# Patient Record
Sex: FEMALE | URBAN - METROPOLITAN AREA
[De-identification: names, ages, dates, MRNs, and addresses within clinical notes are randomized per-mention and may not be internally consistent; named-entity substitution may affect disease eponyms.]

---

## 2017-03-14 ENCOUNTER — APPOINTMENT (RX ONLY)
Dept: URBAN - METROPOLITAN AREA CLINIC 148 | Facility: CLINIC | Age: 22
Setting detail: DERMATOLOGY
End: 2017-03-14

## 2017-03-14 DIAGNOSIS — L73.2 HIDRADENITIS SUPPURATIVA: ICD-10-CM

## 2017-03-14 DIAGNOSIS — D22 MELANOCYTIC NEVI: ICD-10-CM

## 2017-03-14 PROBLEM — D22.5 MELANOCYTIC NEVI OF TRUNK: Status: ACTIVE | Noted: 2017-03-14

## 2017-03-14 PROCEDURE — 99202 OFFICE O/P NEW SF 15 MIN: CPT | Mod: 25

## 2017-03-14 PROCEDURE — ? COUNSELING

## 2017-03-14 PROCEDURE — ? INTRALESIONAL KENALOG

## 2017-03-14 PROCEDURE — 11900 INJECT SKIN LESIONS </W 7: CPT

## 2017-03-14 ASSESSMENT — LOCATION SIMPLE DESCRIPTION DERM
LOCATION SIMPLE: RIGHT AXILLARY VAULT
LOCATION SIMPLE: RIGHT UPPER BACK
LOCATION SIMPLE: LEFT POSTERIOR AXILLA
LOCATION SIMPLE: LEFT AXILLARY VAULT

## 2017-03-14 ASSESSMENT — LOCATION DETAILED DESCRIPTION DERM
LOCATION DETAILED: LEFT POSTERIOR AXILLA
LOCATION DETAILED: RIGHT AXILLARY VAULT
LOCATION DETAILED: RIGHT MEDIAL UPPER BACK
LOCATION DETAILED: LEFT AXILLARY VAULT

## 2017-03-14 ASSESSMENT — LOCATION ZONE DERM
LOCATION ZONE: AXILLAE
LOCATION ZONE: TRUNK

## 2017-03-14 NOTE — PROCEDURE: INTRALESIONAL KENALOG
Include Z78.9 (Other Specified Conditions Influencing Health Status) As An Associated Diagnosis?: No
Kenalog Preparation: Kenalog
Consent: The risks of atrophy were reviewed with the patient.
Detail Level: Detailed
Concentration Of Kenalog Solution Injected (Mg/Ml): 40.0
X Size Of Lesion In Cm (Optional): 0
Medical Necessity Clause: This procedure was medically necessary because the lesions that were treated were:
Administered By (Optional): Abelardo Avitia
Ndc# For Kenalog Only: [id number]
Lot # For Kenalog (Optional): [id number]
Expiration Date For Kenalog (Optional): Apr 2018
Total Volume (Ccs): 0.4

## 2017-04-06 ENCOUNTER — APPOINTMENT (RX ONLY)
Dept: URBAN - METROPOLITAN AREA CLINIC 148 | Facility: CLINIC | Age: 22
Setting detail: DERMATOLOGY
End: 2017-04-06

## 2017-04-06 DIAGNOSIS — L73.2 HIDRADENITIS SUPPURATIVA: ICD-10-CM

## 2017-04-06 PROCEDURE — ? COUNSELING

## 2017-04-06 PROCEDURE — 11900 INJECT SKIN LESIONS </W 7: CPT

## 2017-04-06 PROCEDURE — ? INTRALESIONAL KENALOG

## 2017-04-06 ASSESSMENT — LOCATION SIMPLE DESCRIPTION DERM
LOCATION SIMPLE: GENITALIA
LOCATION SIMPLE: LEFT AXILLARY VAULT

## 2017-04-06 ASSESSMENT — LOCATION ZONE DERM
LOCATION ZONE: TRUNK
LOCATION ZONE: AXILLAE

## 2017-04-06 ASSESSMENT — LOCATION DETAILED DESCRIPTION DERM
LOCATION DETAILED: LEFT AXILLARY VAULT
LOCATION DETAILED: GENITALIA

## 2017-04-06 NOTE — PROCEDURE: INTRALESIONAL KENALOG
Consent: The risks of atrophy were reviewed with the patient.
Medical Necessity Clause: This procedure was medically necessary because the lesions that were treated were:
Total Volume (Ccs): 0.5
Expiration Date For Kenalog (Optional): Apr 2018
Lot # For Kenalog (Optional): [id number]
Kenalog Preparation: Kenalog
Administered By (Optional): Abelardo Avitia
Concentration Of Kenalog Solution Injected (Mg/Ml): 40.0
Include Z78.9 (Other Specified Conditions Influencing Health Status) As An Associated Diagnosis?: No
Ndc# For Kenalog Only: [id number]
Detail Level: Detailed
X Size Of Lesion In Cm (Optional): 0

## 2017-10-31 ENCOUNTER — APPOINTMENT (RX ONLY)
Dept: URBAN - METROPOLITAN AREA CLINIC 148 | Facility: CLINIC | Age: 22
Setting detail: DERMATOLOGY
End: 2017-10-31

## 2017-10-31 DIAGNOSIS — L08.9 LOCAL INFECTION OF THE SKIN AND SUBCUTANEOUS TISSUE, UNSPECIFIED: ICD-10-CM

## 2017-10-31 DIAGNOSIS — L73.2 HIDRADENITIS SUPPURATIVA: ICD-10-CM

## 2017-10-31 PROCEDURE — ? ORDER TESTS

## 2017-10-31 PROCEDURE — ? PRESCRIPTION

## 2017-10-31 PROCEDURE — ? COUNSELING

## 2017-10-31 PROCEDURE — 99213 OFFICE O/P EST LOW 20 MIN: CPT

## 2017-10-31 RX ORDER — MUPIROCIN 20 MG/G
OINTMENT TOPICAL BID
Qty: 1 | Refills: 1 | Status: ERX | COMMUNITY
Start: 2017-10-31

## 2017-10-31 RX ORDER — CLINDAMYCIN PHOSPHATE 10 MG/G
GEL TOPICAL QHS
Qty: 1 | Refills: 1 | Status: ERX | COMMUNITY
Start: 2017-10-31

## 2017-10-31 RX ORDER — DOXYCYCLINE 100 MG/1
CAPSULE ORAL BID
Qty: 28 | Refills: 0 | Status: ERX | COMMUNITY
Start: 2017-10-31

## 2017-10-31 RX ORDER — BENZOYL PEROXIDE 5 G/100G
GEL TOPICAL QHS
Qty: 1 | Refills: 1 | Status: ERX | COMMUNITY
Start: 2017-10-31

## 2017-10-31 RX ADMIN — MUPIROCIN: 20 OINTMENT TOPICAL at 00:00

## 2017-10-31 RX ADMIN — BENZOYL PEROXIDE: 5 GEL TOPICAL at 00:00

## 2017-10-31 RX ADMIN — DOXYCYCLINE: 100 CAPSULE ORAL at 00:00

## 2017-10-31 RX ADMIN — CLINDAMYCIN PHOSPHATE: 10 GEL TOPICAL at 00:00

## 2017-10-31 ASSESSMENT — LOCATION DETAILED DESCRIPTION DERM: LOCATION DETAILED: LEFT SUPRAPUBIC SKIN

## 2017-10-31 ASSESSMENT — LOCATION ZONE DERM: LOCATION ZONE: TRUNK

## 2017-10-31 ASSESSMENT — LOCATION SIMPLE DESCRIPTION DERM: LOCATION SIMPLE: GROIN

## 2017-11-10 ENCOUNTER — APPOINTMENT (RX ONLY)
Dept: URBAN - METROPOLITAN AREA CLINIC 148 | Facility: CLINIC | Age: 22
Setting detail: DERMATOLOGY
End: 2017-11-10

## 2017-11-10 DIAGNOSIS — L73.2 HIDRADENITIS SUPPURATIVA: ICD-10-CM | Status: STABLE

## 2017-11-10 DIAGNOSIS — L08.9 LOCAL INFECTION OF THE SKIN AND SUBCUTANEOUS TISSUE, UNSPECIFIED: ICD-10-CM | Status: IMPROVED

## 2017-11-10 PROCEDURE — ? PRESCRIPTION

## 2017-11-10 PROCEDURE — 99213 OFFICE O/P EST LOW 20 MIN: CPT

## 2017-11-10 PROCEDURE — ? COUNSELING

## 2017-11-10 RX ORDER — SULFAMETHOXAZOLE AND TRIMETHOPRIM 800; 160 MG/1; MG/1
TABLET ORAL BID
Qty: 14 | Refills: 0 | Status: ERX | COMMUNITY
Start: 2017-11-10

## 2017-11-10 RX ADMIN — SULFAMETHOXAZOLE AND TRIMETHOPRIM: 800; 160 TABLET ORAL at 00:00

## 2017-11-10 ASSESSMENT — LOCATION SIMPLE DESCRIPTION DERM: LOCATION SIMPLE: GROIN

## 2017-11-10 ASSESSMENT — LOCATION ZONE DERM: LOCATION ZONE: TRUNK

## 2017-11-10 ASSESSMENT — LOCATION DETAILED DESCRIPTION DERM: LOCATION DETAILED: LEFT SUPRAPUBIC SKIN

## 2018-04-18 ENCOUNTER — APPOINTMENT (RX ONLY)
Dept: URBAN - METROPOLITAN AREA CLINIC 148 | Facility: CLINIC | Age: 23
Setting detail: DERMATOLOGY
End: 2018-04-18

## 2018-04-18 DIAGNOSIS — L73.2 HIDRADENITIS SUPPURATIVA: ICD-10-CM

## 2018-04-18 PROCEDURE — 99214 OFFICE O/P EST MOD 30 MIN: CPT

## 2018-04-18 PROCEDURE — ? TREATMENT REGIMEN

## 2018-04-18 PROCEDURE — ? PRESCRIPTION

## 2018-04-18 PROCEDURE — ? COUNSELING

## 2018-04-18 RX ORDER — DOXYCYCLINE 100 MG/1
CAPSULE ORAL BID
Qty: 60 | Refills: 1 | Status: ERX

## 2018-04-18 RX ORDER — BENZOYL PEROXIDE 5 G/100G
GEL TOPICAL QHS
Qty: 1 | Refills: 1 | Status: CANCELLED
Stop reason: CLARIF

## 2018-04-18 RX ORDER — BENZOYL PEROXIDE 100 MG/ML
LIQUID TOPICAL BID
Qty: 1 | Refills: 1 | Status: ERX | COMMUNITY
Start: 2018-04-18

## 2018-04-18 RX ORDER — CLINDAMYCIN PHOSPHATE 10 MG/G
GEL TOPICAL BID
Qty: 1 | Refills: 1 | Status: ERX

## 2018-04-18 RX ADMIN — BENZOYL PEROXIDE: 100 LIQUID TOPICAL at 00:00

## 2018-04-18 ASSESSMENT — LOCATION DETAILED DESCRIPTION DERM
LOCATION DETAILED: RIGHT AXILLARY VAULT
LOCATION DETAILED: LEFT SUPRAPUBIC SKIN
LOCATION DETAILED: LEFT AXILLARY VAULT

## 2018-04-18 ASSESSMENT — LOCATION SIMPLE DESCRIPTION DERM
LOCATION SIMPLE: RIGHT AXILLARY VAULT
LOCATION SIMPLE: GROIN
LOCATION SIMPLE: LEFT AXILLARY VAULT

## 2018-04-18 ASSESSMENT — LOCATION ZONE DERM
LOCATION ZONE: AXILLAE
LOCATION ZONE: TRUNK

## 2018-04-18 NOTE — PROCEDURE: TREATMENT REGIMEN
Initiate Treatment: Doxycycline 1 tab twice daily, benzoyl peroxide cleanser
Detail Level: Zone
Modify Regimen: Can use clindamycin without benzoyl peroxide
Plan: Discussed possible treatment with humira vs. other option. Patient to take doxycycline 100mg 1 tab twice a day for two weeks and if lesions are controlled can decrease to 1 tablet of doxycycline 100 mg for two weeks. If lesions continue to break out continue with doxycycline 100mg and follow up in two months. Apply Clindamycin topically on the cystic lesion with out BP gel. Continue BP cleanser BID.
Continue Regimen: Clindamycin benzoyl peroxide

## 2018-07-06 ENCOUNTER — APPOINTMENT (RX ONLY)
Dept: URBAN - METROPOLITAN AREA CLINIC 148 | Facility: CLINIC | Age: 23
Setting detail: DERMATOLOGY
End: 2018-07-06

## 2018-07-06 DIAGNOSIS — L73.2 HIDRADENITIS SUPPURATIVA: ICD-10-CM | Status: WELL CONTROLLED

## 2018-07-06 PROCEDURE — ? PRESCRIPTION

## 2018-07-06 PROCEDURE — 99214 OFFICE O/P EST MOD 30 MIN: CPT

## 2018-07-06 PROCEDURE — ? COUNSELING

## 2018-07-06 RX ORDER — CLINDAMYCIN PHOSPHATE 10 MG/G
GEL TOPICAL BID
Qty: 1 | Refills: 1 | Status: ERX

## 2018-07-06 RX ORDER — BENZOYL PEROXIDE 100 MG/ML
LIQUID TOPICAL BID
Qty: 1 | Refills: 1 | Status: ERX

## 2018-07-06 ASSESSMENT — LOCATION DETAILED DESCRIPTION DERM
LOCATION DETAILED: LEFT AXILLARY VAULT
LOCATION DETAILED: RIGHT AXILLARY VAULT
LOCATION DETAILED: LEFT SUPRAPUBIC SKIN

## 2018-07-06 ASSESSMENT — LOCATION ZONE DERM
LOCATION ZONE: AXILLAE
LOCATION ZONE: TRUNK

## 2018-07-06 ASSESSMENT — LOCATION SIMPLE DESCRIPTION DERM
LOCATION SIMPLE: LEFT AXILLARY VAULT
LOCATION SIMPLE: GROIN
LOCATION SIMPLE: RIGHT AXILLARY VAULT

## 2018-10-19 ENCOUNTER — RX ONLY (OUTPATIENT)
Age: 23
Setting detail: RX ONLY
End: 2018-10-19

## 2018-10-19 RX ORDER — DOXYCYCLINE 100 MG/1
CAPSULE ORAL BID
Qty: 60 | Refills: 0 | Status: ERX

## 2018-11-02 ENCOUNTER — APPOINTMENT (RX ONLY)
Dept: URBAN - METROPOLITAN AREA CLINIC 148 | Facility: CLINIC | Age: 23
Setting detail: DERMATOLOGY
End: 2018-11-02

## 2018-11-02 DIAGNOSIS — L72.8 OTHER FOLLICULAR CYSTS OF THE SKIN AND SUBCUTANEOUS TISSUE: ICD-10-CM

## 2018-11-02 DIAGNOSIS — L73.2 HIDRADENITIS SUPPURATIVA: ICD-10-CM

## 2018-11-02 PROCEDURE — ? INTRALESIONAL KENALOG

## 2018-11-02 PROCEDURE — ? COUNSELING

## 2018-11-02 PROCEDURE — ? PRESCRIPTION

## 2018-11-02 PROCEDURE — 99213 OFFICE O/P EST LOW 20 MIN: CPT | Mod: 25

## 2018-11-02 PROCEDURE — ? ORDER TESTS

## 2018-11-02 PROCEDURE — 11900 INJECT SKIN LESIONS </W 7: CPT

## 2018-11-02 RX ORDER — DOXYCYCLINE 100 MG/1
CAPSULE ORAL
Qty: 60 | Refills: 1 | Status: ERX

## 2018-11-02 ASSESSMENT — LOCATION ZONE DERM
LOCATION ZONE: TRUNK
LOCATION ZONE: AXILLAE

## 2018-11-02 ASSESSMENT — LOCATION SIMPLE DESCRIPTION DERM
LOCATION SIMPLE: RIGHT AXILLARY VAULT
LOCATION SIMPLE: GROIN

## 2018-11-02 ASSESSMENT — LOCATION DETAILED DESCRIPTION DERM
LOCATION DETAILED: LEFT SUPRAPUBIC SKIN
LOCATION DETAILED: RIGHT AXILLARY VAULT
LOCATION DETAILED: RIGHT SUPRAPUBIC SKIN

## 2018-11-02 NOTE — PROCEDURE: INTRALESIONAL KENALOG
Kenalog Preparation: Kenalog
Administered By (Optional): Abelardo Avitia PA-C
Include Z78.9 (Other Specified Conditions Influencing Health Status) As An Associated Diagnosis?: No
Medical Necessity Clause: This procedure was medically necessary because the lesions that were treated were:
Concentration Of Kenalog Solution Injected (Mg/Ml): 40.0
X Size Of Lesion In Cm (Optional): 0
Detail Level: Detailed
Total Volume (Ccs): 0.2
Consent: The risks of atrophy were reviewed with the patient.

## 2018-11-21 ENCOUNTER — APPOINTMENT (RX ONLY)
Dept: URBAN - METROPOLITAN AREA CLINIC 148 | Facility: CLINIC | Age: 23
Setting detail: DERMATOLOGY
End: 2018-11-21

## 2018-11-21 DIAGNOSIS — L73.2 HIDRADENITIS SUPPURATIVA: ICD-10-CM

## 2018-11-21 DIAGNOSIS — L90.5 SCAR CONDITIONS AND FIBROSIS OF SKIN: ICD-10-CM

## 2018-11-21 PROCEDURE — 11900 INJECT SKIN LESIONS </W 7: CPT

## 2018-11-21 PROCEDURE — ? INTRALESIONAL KENALOG

## 2018-11-21 PROCEDURE — ? OTHER

## 2018-11-21 PROCEDURE — ? PRESCRIPTION

## 2018-11-21 PROCEDURE — 99213 OFFICE O/P EST LOW 20 MIN: CPT | Mod: 25

## 2018-11-21 PROCEDURE — ? COUNSELING

## 2018-11-21 RX ORDER — DOXYCYCLINE 100 MG/1
CAPSULE ORAL
Qty: 60 | Refills: 1 | Status: ERX | COMMUNITY
Start: 2018-11-21

## 2018-11-21 RX ADMIN — DOXYCYCLINE: 100 CAPSULE ORAL at 00:00

## 2018-11-21 ASSESSMENT — LOCATION SIMPLE DESCRIPTION DERM
LOCATION SIMPLE: RIGHT AXILLARY VAULT
LOCATION SIMPLE: LEFT AXILLARY VAULT
LOCATION SIMPLE: GROIN

## 2018-11-21 ASSESSMENT — LOCATION ZONE DERM
LOCATION ZONE: TRUNK
LOCATION ZONE: AXILLAE

## 2018-11-21 ASSESSMENT — LOCATION DETAILED DESCRIPTION DERM
LOCATION DETAILED: LEFT AXILLARY VAULT
LOCATION DETAILED: LEFT SUPRAPUBIC SKIN
LOCATION DETAILED: RIGHT AXILLARY VAULT

## 2018-11-21 NOTE — PROCEDURE: INTRALESIONAL KENALOG
X Size Of Lesion In Cm (Optional): 0
Concentration Of Kenalog Solution Injected (Mg/Ml): 10.0
Administered By (Optional): Abelardo Avitia PA-C
Consent: The risks of atrophy were reviewed with the patient.
Detail Level: Detailed
Medical Necessity Clause: This procedure was medically necessary because the lesions that were treated were:
Include Z78.9 (Other Specified Conditions Influencing Health Status) As An Associated Diagnosis?: No
Kenalog Preparation: Kenalog
Total Volume (Ccs): .2

## 2018-11-21 NOTE — PROCEDURE: OTHER
Other (Free Text): She will only use topical clindamycin daily. Cleanse BID. Doxy for severe breakouts
Detail Level: Simple
Note Text (......Xxx Chief Complaint.): This diagnosis correlates with the

## 2020-02-11 ENCOUNTER — APPOINTMENT (RX ONLY)
Dept: URBAN - METROPOLITAN AREA CLINIC 148 | Facility: CLINIC | Age: 25
Setting detail: DERMATOLOGY
End: 2020-02-11

## 2020-02-11 DIAGNOSIS — L90.5 SCAR CONDITIONS AND FIBROSIS OF SKIN: ICD-10-CM

## 2020-02-11 DIAGNOSIS — L73.2 HIDRADENITIS SUPPURATIVA: ICD-10-CM | Status: WELL CONTROLLED

## 2020-02-11 PROCEDURE — ? COUNSELING

## 2020-02-11 PROCEDURE — 99213 OFFICE O/P EST LOW 20 MIN: CPT

## 2020-02-11 PROCEDURE — ? PRESCRIPTION

## 2020-02-11 PROCEDURE — ? OTHER

## 2020-02-11 RX ORDER — BENZOYL PEROXIDE 100 MG/ML
LIQUID TOPICAL BID
Qty: 1 | Refills: 11 | Status: ERX | COMMUNITY
Start: 2020-02-11

## 2020-02-11 RX ORDER — CLINDAMYCIN PHOSPHATE 10 MG/G
GEL TOPICAL
Qty: 1 | Refills: 11 | Status: ERX

## 2020-02-11 RX ADMIN — BENZOYL PEROXIDE: 100 LIQUID TOPICAL at 00:00

## 2020-02-11 ASSESSMENT — LOCATION ZONE DERM
LOCATION ZONE: TRUNK
LOCATION ZONE: AXILLAE

## 2020-02-11 ASSESSMENT — LOCATION SIMPLE DESCRIPTION DERM
LOCATION SIMPLE: GROIN
LOCATION SIMPLE: RIGHT AXILLARY VAULT

## 2020-02-11 ASSESSMENT — LOCATION DETAILED DESCRIPTION DERM
LOCATION DETAILED: RIGHT AXILLARY VAULT
LOCATION DETAILED: LEFT SUPRAPUBIC SKIN

## 2020-02-11 NOTE — PROCEDURE: OTHER
Note Text (......Xxx Chief Complaint.): This diagnosis correlates with the
Other (Free Text): Can do injections when areas flare up
Detail Level: Zone

## 2022-01-06 ENCOUNTER — APPOINTMENT (RX ONLY)
Dept: URBAN - METROPOLITAN AREA CLINIC 148 | Facility: CLINIC | Age: 27
Setting detail: DERMATOLOGY
End: 2022-01-06

## 2022-01-06 DIAGNOSIS — L73.2 HIDRADENITIS SUPPURATIVA: ICD-10-CM

## 2022-01-06 DIAGNOSIS — L08.9 LOCAL INFECTION OF THE SKIN AND SUBCUTANEOUS TISSUE, UNSPECIFIED: ICD-10-CM

## 2022-01-06 PROCEDURE — 99214 OFFICE O/P EST MOD 30 MIN: CPT

## 2022-01-06 PROCEDURE — ? PRESCRIPTION

## 2022-01-06 PROCEDURE — ? ORDER TESTS

## 2022-01-06 PROCEDURE — ? COUNSELING

## 2022-01-06 PROCEDURE — ? PRESCRIPTION MEDICATION MANAGEMENT

## 2022-01-06 RX ORDER — BENZOYL PEROXIDE 100 MG/ML
LOTION TOPICAL BID
Qty: 148 | Refills: 11 | Status: ERX | COMMUNITY
Start: 2022-01-06

## 2022-01-06 RX ORDER — CLINDAMYCIN PHOSPHATE 10 MG/G
GEL TOPICAL BID
Qty: 60 | Refills: 11 | Status: ERX | COMMUNITY
Start: 2022-01-06

## 2022-01-06 RX ADMIN — BENZOYL PEROXIDE: 100 LOTION TOPICAL at 00:00

## 2022-01-06 RX ADMIN — CLINDAMYCIN PHOSPHATE: 10 GEL TOPICAL at 00:00

## 2022-01-06 ASSESSMENT — LOCATION DETAILED DESCRIPTION DERM
LOCATION DETAILED: RIGHT AXILLARY VAULT
LOCATION DETAILED: INFERIOR LUMBAR SPINE
LOCATION DETAILED: LEFT SUPRAPUBIC SKIN

## 2022-01-06 ASSESSMENT — LOCATION ZONE DERM
LOCATION ZONE: AXILLAE
LOCATION ZONE: TRUNK

## 2022-01-06 ASSESSMENT — LOCATION SIMPLE DESCRIPTION DERM
LOCATION SIMPLE: GROIN
LOCATION SIMPLE: LOWER BACK
LOCATION SIMPLE: RIGHT AXILLARY VAULT

## 2022-01-06 NOTE — PROCEDURE: PRESCRIPTION MEDICATION MANAGEMENT
Continue Regimen: Clindamycin, benzoyl peroxide
Detail Level: Zone
Render In Strict Bullet Format?: No

## 2023-01-05 ENCOUNTER — APPOINTMENT (RX ONLY)
Dept: URBAN - METROPOLITAN AREA CLINIC 148 | Facility: CLINIC | Age: 28
Setting detail: DERMATOLOGY
End: 2023-01-05

## 2023-01-05 DIAGNOSIS — L663 OTHER SPECIFIED DISEASES OF HAIR AND HAIR FOLLICLES: ICD-10-CM

## 2023-01-05 DIAGNOSIS — L73.2 HIDRADENITIS SUPPURATIVA: ICD-10-CM | Status: WELL CONTROLLED

## 2023-01-05 DIAGNOSIS — L738 OTHER SPECIFIED DISEASES OF HAIR AND HAIR FOLLICLES: ICD-10-CM

## 2023-01-05 DIAGNOSIS — L73.9 FOLLICULAR DISORDER, UNSPECIFIED: ICD-10-CM

## 2023-01-05 PROBLEM — L02.425 FURUNCLE OF RIGHT LOWER LIMB: Status: ACTIVE | Noted: 2023-01-05

## 2023-01-05 PROBLEM — L02.426 FURUNCLE OF LEFT LOWER LIMB: Status: ACTIVE | Noted: 2023-01-05

## 2023-01-05 PROCEDURE — ? PRESCRIPTION MEDICATION MANAGEMENT

## 2023-01-05 PROCEDURE — ? PRESCRIPTION

## 2023-01-05 PROCEDURE — 99214 OFFICE O/P EST MOD 30 MIN: CPT

## 2023-01-05 PROCEDURE — ? COUNSELING

## 2023-01-05 RX ORDER — BENZOYL PEROXIDE 100 MG/ML
LOTION TOPICAL BID
Qty: 148 | Refills: 11 | Status: ERX | COMMUNITY
Start: 2023-01-05

## 2023-01-05 RX ORDER — CLINDAMYCIN PHOSPHATE 10 MG/G
GEL TOPICAL BID
Qty: 60 | Refills: 11 | Status: ERX | COMMUNITY
Start: 2023-01-05

## 2023-01-05 RX ADMIN — BENZOYL PEROXIDE: 100 LOTION TOPICAL at 00:00

## 2023-01-05 RX ADMIN — CLINDAMYCIN PHOSPHATE: 10 GEL TOPICAL at 00:00

## 2023-01-05 ASSESSMENT — LOCATION DETAILED DESCRIPTION DERM
LOCATION DETAILED: RIGHT ANTERIOR PROXIMAL THIGH
LOCATION DETAILED: LEFT ANTERIOR PROXIMAL THIGH
LOCATION DETAILED: RIGHT AXILLARY VAULT
LOCATION DETAILED: LEFT SUPRAPUBIC SKIN

## 2023-01-05 ASSESSMENT — LOCATION ZONE DERM
LOCATION ZONE: AXILLAE
LOCATION ZONE: LEG
LOCATION ZONE: TRUNK

## 2023-01-05 ASSESSMENT — LOCATION SIMPLE DESCRIPTION DERM
LOCATION SIMPLE: RIGHT THIGH
LOCATION SIMPLE: LEFT THIGH
LOCATION SIMPLE: RIGHT AXILLARY VAULT
LOCATION SIMPLE: GROIN

## 2024-01-08 ENCOUNTER — APPOINTMENT (RX ONLY)
Dept: URBAN - METROPOLITAN AREA CLINIC 148 | Facility: CLINIC | Age: 29
Setting detail: DERMATOLOGY
End: 2024-01-08

## 2024-01-08 DIAGNOSIS — L663 OTHER SPECIFIED DISEASES OF HAIR AND HAIR FOLLICLES: ICD-10-CM

## 2024-01-08 DIAGNOSIS — D22 MELANOCYTIC NEVI: ICD-10-CM

## 2024-01-08 DIAGNOSIS — L738 OTHER SPECIFIED DISEASES OF HAIR AND HAIR FOLLICLES: ICD-10-CM

## 2024-01-08 DIAGNOSIS — L73.2 HIDRADENITIS SUPPURATIVA: ICD-10-CM

## 2024-01-08 DIAGNOSIS — L73.9 FOLLICULAR DISORDER, UNSPECIFIED: ICD-10-CM

## 2024-01-08 PROBLEM — L02.425 FURUNCLE OF RIGHT LOWER LIMB: Status: ACTIVE | Noted: 2024-01-08

## 2024-01-08 PROBLEM — D48.5 NEOPLASM OF UNCERTAIN BEHAVIOR OF SKIN: Status: ACTIVE | Noted: 2024-01-08

## 2024-01-08 PROCEDURE — 11102 TANGNTL BX SKIN SINGLE LES: CPT

## 2024-01-08 PROCEDURE — ? BIOPSY BY SHAVE METHOD

## 2024-01-08 PROCEDURE — ? TREATMENT REGIMEN

## 2024-01-08 PROCEDURE — 99214 OFFICE O/P EST MOD 30 MIN: CPT | Mod: 25

## 2024-01-08 PROCEDURE — ? COUNSELING

## 2024-01-08 PROCEDURE — ? PRESCRIPTION

## 2024-01-08 RX ORDER — BENZOYL PEROXIDE 100 MG/ML
LOTION TOPICAL BID
Qty: 148 | Refills: 11 | Status: ERX

## 2024-01-08 RX ORDER — CLINDAMYCIN PHOSPHATE 10 MG/G
GEL TOPICAL BID
Qty: 60 | Refills: 11 | Status: ERX

## 2024-01-08 ASSESSMENT — LOCATION SIMPLE DESCRIPTION DERM
LOCATION SIMPLE: RIGHT PRETIBIAL REGION
LOCATION SIMPLE: RIGHT UPPER BACK
LOCATION SIMPLE: GROIN
LOCATION SIMPLE: RIGHT AXILLARY VAULT

## 2024-01-08 ASSESSMENT — LOCATION DETAILED DESCRIPTION DERM
LOCATION DETAILED: RIGHT AXILLARY VAULT
LOCATION DETAILED: RIGHT SUPERIOR UPPER BACK
LOCATION DETAILED: LEFT SUPRAPUBIC SKIN
LOCATION DETAILED: RIGHT DISTAL PRETIBIAL REGION

## 2024-01-08 ASSESSMENT — LOCATION ZONE DERM
LOCATION ZONE: TRUNK
LOCATION ZONE: AXILLAE
LOCATION ZONE: LEG

## 2024-01-08 NOTE — PROCEDURE: TREATMENT REGIMEN
Discontinue Regimen: Epsom baths
Continue Regimen: Clindamycin \\nBenzoyl peroxide
Detail Level: Zone

## 2024-03-05 ENCOUNTER — APPOINTMENT (RX ONLY)
Dept: URBAN - METROPOLITAN AREA CLINIC 148 | Facility: CLINIC | Age: 29
Setting detail: DERMATOLOGY
End: 2024-03-05

## 2024-03-05 DIAGNOSIS — D22 MELANOCYTIC NEVI: ICD-10-CM

## 2024-03-05 PROBLEM — D22.5 MELANOCYTIC NEVI OF TRUNK: Status: ACTIVE | Noted: 2024-03-05

## 2024-03-05 PROCEDURE — ? SHAVE REMOVAL

## 2024-03-05 PROCEDURE — ? PATHOLOGY DISCUSSION

## 2024-03-05 PROCEDURE — 11302 SHAVE SKIN LESION 1.1-2.0 CM: CPT

## 2024-03-05 ASSESSMENT — LOCATION DETAILED DESCRIPTION DERM: LOCATION DETAILED: RIGHT SUPERIOR UPPER BACK

## 2024-03-05 ASSESSMENT — LOCATION SIMPLE DESCRIPTION DERM: LOCATION SIMPLE: RIGHT UPPER BACK

## 2024-03-05 ASSESSMENT — LOCATION ZONE DERM: LOCATION ZONE: TRUNK

## 2024-03-05 NOTE — PROCEDURE: SHAVE REMOVAL
Medical Necessity Information: It is in your best interest to select a reason for this procedure from the list below. All of these items fulfill various CMS LCD requirements except the new and changing color options.
Medical Necessity Clause: This procedure was medically necessary because the lesion that was treated was:
Lab: [id number]
Lab Facility: [id number]
Detail Level: Detailed
Was A Bandage Applied: Yes
Size Of Lesion In Cm (Required): 1.5
X Size Of Lesion In Cm (Optional): 0
Depth Of Shave: dermis
Biopsy Method: Dermablade
Anesthesia Type: 1% lidocaine with epinephrine
Anesthesia Volume In Cc: 0.3
Hemostasis: Drysol
Wound Care: Petrolatum
Render Path Notes In Note?: No
Consent was obtained from the patient. The risks and benefits to therapy were discussed in detail. Specifically, the risks of infection, scarring, bleeding, prolonged wound healing, incomplete removal, allergy to anesthesia, nerve injury and recurrence were addressed. Prior to the procedure, the treatment site was clearly identified and confirmed by the patient. All components of Universal Protocol/PAUSE Rule completed.
Post-Care Instructions: I reviewed with the patient in detail post-care instructions. Patient is to keep the biopsy site dry overnight, and then apply bacitracin twice daily until healed. Patient may apply hydrogen peroxide soaks to remove any crusting.
Notification Instructions: Patient will be notified of pathology results. However, patient instructed to call the office if not contacted within 2 weeks.
Billing Type: Third-Party Bill

## 2025-03-18 ENCOUNTER — APPOINTMENT (OUTPATIENT)
Dept: URBAN - METROPOLITAN AREA CLINIC 148 | Facility: CLINIC | Age: 30
Setting detail: DERMATOLOGY
End: 2025-03-18

## 2025-03-18 DIAGNOSIS — L20.89 OTHER ATOPIC DERMATITIS: ICD-10-CM

## 2025-03-18 DIAGNOSIS — Z71.89 OTHER SPECIFIED COUNSELING: ICD-10-CM

## 2025-03-18 DIAGNOSIS — D18.0 HEMANGIOMA: ICD-10-CM

## 2025-03-18 DIAGNOSIS — L82.1 OTHER SEBORRHEIC KERATOSIS: ICD-10-CM

## 2025-03-18 DIAGNOSIS — L73.2 HIDRADENITIS SUPPURATIVA: ICD-10-CM

## 2025-03-18 DIAGNOSIS — D22 MELANOCYTIC NEVI: ICD-10-CM

## 2025-03-18 DIAGNOSIS — Z87.2 PERSONAL HISTORY OF DISEASES OF THE SKIN AND SUBCUTANEOUS TISSUE: ICD-10-CM

## 2025-03-18 DIAGNOSIS — L57.8 OTHER SKIN CHANGES DUE TO CHRONIC EXPOSURE TO NONIONIZING RADIATION: ICD-10-CM

## 2025-03-18 DIAGNOSIS — L73.9 FOLLICULAR DISORDER, UNSPECIFIED: ICD-10-CM

## 2025-03-18 PROBLEM — D48.5 NEOPLASM OF UNCERTAIN BEHAVIOR OF SKIN: Status: ACTIVE | Noted: 2025-03-18

## 2025-03-18 PROBLEM — D22.5 MELANOCYTIC NEVI OF TRUNK: Status: ACTIVE | Noted: 2025-03-18

## 2025-03-18 PROBLEM — D18.01 HEMANGIOMA OF SKIN AND SUBCUTANEOUS TISSUE: Status: ACTIVE | Noted: 2025-03-18

## 2025-03-18 PROCEDURE — ? COUNSELING

## 2025-03-18 PROCEDURE — ? OBSERVATION

## 2025-03-18 PROCEDURE — ? TREATMENT REGIMEN

## 2025-03-18 PROCEDURE — 99214 OFFICE O/P EST MOD 30 MIN: CPT | Mod: 25

## 2025-03-18 PROCEDURE — 11102 TANGNTL BX SKIN SINGLE LES: CPT

## 2025-03-18 PROCEDURE — ? BIOPSY BY SHAVE METHOD

## 2025-03-18 PROCEDURE — ? PRESCRIPTION

## 2025-03-18 PROCEDURE — ? SUNSCREEN RECOMMENDATIONS

## 2025-03-18 RX ORDER — HYDROCORTISONE 25 MG/G
CREAM TOPICAL
Qty: 30 | Refills: 1 | Status: ERX | COMMUNITY
Start: 2025-03-18

## 2025-03-18 RX ADMIN — HYDROCORTISONE: 25 CREAM TOPICAL at 00:00

## 2025-03-18 ASSESSMENT — LOCATION DETAILED DESCRIPTION DERM
LOCATION DETAILED: NASAL TIP
LOCATION DETAILED: LEFT VENTRAL PROXIMAL FOREARM
LOCATION DETAILED: SUPERIOR THORACIC SPINE
LOCATION DETAILED: UPPER STERNUM
LOCATION DETAILED: INFERIOR LUMBAR SPINE
LOCATION DETAILED: RIGHT SUPERIOR UPPER BACK
LOCATION DETAILED: MIDDLE STERNUM
LOCATION DETAILED: RIGHT SUPRAPUBIC SKIN
LOCATION DETAILED: RIGHT ANTERIOR PROXIMAL THIGH
LOCATION DETAILED: RIGHT AXILLARY VAULT
LOCATION DETAILED: LEFT ANTERIOR AXILLA
LOCATION DETAILED: LEFT ANTERIOR PROXIMAL THIGH
LOCATION DETAILED: MID POSTERIOR NECK
LOCATION DETAILED: SUPERIOR MID FOREHEAD
LOCATION DETAILED: LOWER STERNUM

## 2025-03-18 ASSESSMENT — LOCATION SIMPLE DESCRIPTION DERM
LOCATION SIMPLE: POSTERIOR NECK
LOCATION SIMPLE: SUPERIOR FOREHEAD
LOCATION SIMPLE: LEFT ANTERIOR AXILLA
LOCATION SIMPLE: RIGHT THIGH
LOCATION SIMPLE: LOWER BACK
LOCATION SIMPLE: UPPER BACK
LOCATION SIMPLE: RIGHT UPPER BACK
LOCATION SIMPLE: NOSE
LOCATION SIMPLE: LEFT FOREARM
LOCATION SIMPLE: RIGHT AXILLARY VAULT
LOCATION SIMPLE: CHEST
LOCATION SIMPLE: GROIN
LOCATION SIMPLE: LEFT THIGH

## 2025-03-18 ASSESSMENT — LOCATION ZONE DERM
LOCATION ZONE: NECK
LOCATION ZONE: TRUNK
LOCATION ZONE: FACE
LOCATION ZONE: AXILLAE
LOCATION ZONE: ARM
LOCATION ZONE: LEG
LOCATION ZONE: NOSE

## 2025-03-18 NOTE — PROCEDURE: TREATMENT REGIMEN
Detail Level: Zone
Otc Regimen: Cetaphil cream moisturizer daily
Continue Regimen: Benzoyl peroxide 4-6% cleanser
Initiate Treatment: Hydrocortisone 2.5% for eczema on face